# Patient Record
Sex: FEMALE | Race: WHITE | HISPANIC OR LATINO | URBAN - METROPOLITAN AREA
[De-identification: names, ages, dates, MRNs, and addresses within clinical notes are randomized per-mention and may not be internally consistent; named-entity substitution may affect disease eponyms.]

---

## 2017-11-04 ENCOUNTER — EMERGENCY (EMERGENCY)
Facility: HOSPITAL | Age: 32
LOS: 1 days | Discharge: HOME | End: 2017-11-04
Attending: EMERGENCY MEDICINE | Admitting: EMERGENCY MEDICINE
Payer: COMMERCIAL

## 2017-11-04 VITALS
RESPIRATION RATE: 18 BRPM | OXYGEN SATURATION: 98 % | SYSTOLIC BLOOD PRESSURE: 117 MMHG | TEMPERATURE: 98 F | DIASTOLIC BLOOD PRESSURE: 57 MMHG | HEART RATE: 98 BPM

## 2017-11-04 LAB
ALBUMIN SERPL ELPH-MCNC: 4 G/DL — SIGNIFICANT CHANGE UP (ref 3.3–5)
ALP SERPL-CCNC: 83 U/L — SIGNIFICANT CHANGE UP (ref 40–120)
ALT FLD-CCNC: 7 U/L — SIGNIFICANT CHANGE UP (ref 4–33)
APPEARANCE UR: SIGNIFICANT CHANGE UP
AST SERPL-CCNC: 12 U/L — SIGNIFICANT CHANGE UP (ref 4–32)
BASOPHILS # BLD AUTO: 0.03 K/UL — SIGNIFICANT CHANGE UP (ref 0–0.2)
BASOPHILS NFR BLD AUTO: 0.3 % — SIGNIFICANT CHANGE UP (ref 0–2)
BILIRUB SERPL-MCNC: 0.2 MG/DL — SIGNIFICANT CHANGE UP (ref 0.2–1.2)
BILIRUB UR-MCNC: NEGATIVE — SIGNIFICANT CHANGE UP
BLOOD UR QL VISUAL: HIGH
BUN SERPL-MCNC: 12 MG/DL — SIGNIFICANT CHANGE UP (ref 7–23)
CALCIUM SERPL-MCNC: 9 MG/DL — SIGNIFICANT CHANGE UP (ref 8.4–10.5)
CHLORIDE SERPL-SCNC: 102 MMOL/L — SIGNIFICANT CHANGE UP (ref 98–107)
CK MB BLD-MCNC: 1.79 NG/ML — SIGNIFICANT CHANGE UP (ref 1–4.7)
CK MB BLD-MCNC: SIGNIFICANT CHANGE UP (ref 0–2.5)
CK SERPL-CCNC: 147 U/L — SIGNIFICANT CHANGE UP (ref 25–170)
CO2 SERPL-SCNC: 25 MMOL/L — SIGNIFICANT CHANGE UP (ref 22–31)
COLOR SPEC: HIGH
CREAT SERPL-MCNC: 0.5 MG/DL — SIGNIFICANT CHANGE UP (ref 0.5–1.3)
D DIMER BLD IA.RAPID-MCNC: 163 NG/ML — SIGNIFICANT CHANGE UP
EOSINOPHIL # BLD AUTO: 0.04 K/UL — SIGNIFICANT CHANGE UP (ref 0–0.5)
EOSINOPHIL NFR BLD AUTO: 0.4 % — SIGNIFICANT CHANGE UP (ref 0–6)
GLUCOSE SERPL-MCNC: 94 MG/DL — SIGNIFICANT CHANGE UP (ref 70–99)
GLUCOSE UR-MCNC: NEGATIVE — SIGNIFICANT CHANGE UP
HBA1C BLD-MCNC: 5.8 % — HIGH (ref 4–5.6)
HCT VFR BLD CALC: 40.6 % — SIGNIFICANT CHANGE UP (ref 34.5–45)
HGB BLD-MCNC: 12.8 G/DL — SIGNIFICANT CHANGE UP (ref 11.5–15.5)
IMM GRANULOCYTES # BLD AUTO: 0.03 # — SIGNIFICANT CHANGE UP
IMM GRANULOCYTES NFR BLD AUTO: 0.3 % — SIGNIFICANT CHANGE UP (ref 0–1.5)
KETONES UR-MCNC: NEGATIVE — SIGNIFICANT CHANGE UP
LEUKOCYTE ESTERASE UR-ACNC: SIGNIFICANT CHANGE UP
LYMPHOCYTES # BLD AUTO: 2.01 K/UL — SIGNIFICANT CHANGE UP (ref 1–3.3)
LYMPHOCYTES # BLD AUTO: 20.6 % — SIGNIFICANT CHANGE UP (ref 13–44)
MCHC RBC-ENTMCNC: 30 PG — SIGNIFICANT CHANGE UP (ref 27–34)
MCHC RBC-ENTMCNC: 31.5 % — LOW (ref 32–36)
MCV RBC AUTO: 95.3 FL — SIGNIFICANT CHANGE UP (ref 80–100)
MONOCYTES # BLD AUTO: 0.68 K/UL — SIGNIFICANT CHANGE UP (ref 0–0.9)
MONOCYTES NFR BLD AUTO: 7 % — SIGNIFICANT CHANGE UP (ref 2–14)
MUCOUS THREADS # UR AUTO: SIGNIFICANT CHANGE UP
NEUTROPHILS # BLD AUTO: 6.99 K/UL — SIGNIFICANT CHANGE UP (ref 1.8–7.4)
NEUTROPHILS NFR BLD AUTO: 71.4 % — SIGNIFICANT CHANGE UP (ref 43–77)
NITRITE UR-MCNC: NEGATIVE — SIGNIFICANT CHANGE UP
NRBC # FLD: 0 — SIGNIFICANT CHANGE UP
PH UR: 6.5 — SIGNIFICANT CHANGE UP (ref 4.6–8)
PLATELET # BLD AUTO: 335 K/UL — SIGNIFICANT CHANGE UP (ref 150–400)
PMV BLD: 9.7 FL — SIGNIFICANT CHANGE UP (ref 7–13)
POTASSIUM SERPL-MCNC: 3.6 MMOL/L — SIGNIFICANT CHANGE UP (ref 3.5–5.3)
POTASSIUM SERPL-SCNC: 3.6 MMOL/L — SIGNIFICANT CHANGE UP (ref 3.5–5.3)
PROT SERPL-MCNC: 7.6 G/DL — SIGNIFICANT CHANGE UP (ref 6–8.3)
PROT UR-MCNC: 30 — HIGH
RBC # BLD: 4.26 M/UL — SIGNIFICANT CHANGE UP (ref 3.8–5.2)
RBC # FLD: 13.7 % — SIGNIFICANT CHANGE UP (ref 10.3–14.5)
RBC CASTS # UR COMP ASSIST: SIGNIFICANT CHANGE UP (ref 0–?)
SODIUM SERPL-SCNC: 140 MMOL/L — SIGNIFICANT CHANGE UP (ref 135–145)
SP GR SPEC: 1.02 — SIGNIFICANT CHANGE UP (ref 1–1.03)
SQUAMOUS # UR AUTO: SIGNIFICANT CHANGE UP
TROPONIN T SERPL-MCNC: < 0.06 NG/ML — SIGNIFICANT CHANGE UP (ref 0–0.06)
UROBILINOGEN FLD QL: NORMAL E.U. — SIGNIFICANT CHANGE UP (ref 0.1–0.2)
WBC # BLD: 9.78 K/UL — SIGNIFICANT CHANGE UP (ref 3.8–10.5)
WBC # FLD AUTO: 9.78 K/UL — SIGNIFICANT CHANGE UP (ref 3.8–10.5)
WBC UR QL: SIGNIFICANT CHANGE UP (ref 0–?)

## 2017-11-04 PROCEDURE — 99220: CPT | Mod: 25

## 2017-11-04 PROCEDURE — 93010 ELECTROCARDIOGRAM REPORT: CPT | Mod: 59

## 2017-11-04 PROCEDURE — 71020: CPT | Mod: 26

## 2017-11-04 RX ORDER — ASPIRIN/CALCIUM CARB/MAGNESIUM 324 MG
325 TABLET ORAL ONCE
Qty: 0 | Refills: 0 | Status: COMPLETED | OUTPATIENT
Start: 2017-11-04 | End: 2017-11-04

## 2017-11-04 RX ADMIN — Medication 325 MILLIGRAM(S): at 16:43

## 2017-11-04 NOTE — ED PROVIDER NOTE - PROGRESS NOTE DETAILS
Natividad RODRIGUES- pt agreed to stay in cdu for 2 sets vs stress test, plan to r/o acs, likely anxiety attack

## 2017-11-04 NOTE — ED CDU PROVIDER INITIAL DAY NOTE - MEDICAL DECISION MAKING DETAILS
HEART score 1, will get 2 sets of David, and pt will f/u with PMD within the next 3 days. Pt agreeable with plan.

## 2017-11-04 NOTE — ED CDU PROVIDER INITIAL DAY NOTE - ATTENDING CONTRIBUTION TO CARE
Dr. Silva: I performed a face to face bedside interview with patient regarding history of present illness, review of symptoms and past medical history. I completed an independent physical exam.  I have discussed patient's plan of care with PA.   I agree with note as stated above, having amended the EMR as needed to reflect my findings.   This includes HISTORY OF PRESENT ILLNESS, HIV, PAST MEDICAL/SURGICAL/FAMILY/SOCIAL HISTORY, ALLERGIES AND HOME MEDICATIONS, REVIEW OF SYSTEMS, PHYSICAL EXAM, and any PROGRESS NOTES during the time I functioned as the attending physician for this patient.  Dr. Silva: This H&P has been written by myself in its entirety

## 2017-11-04 NOTE — ED PROVIDER NOTE - MEDICAL DECISION MAKING DETAILS
will check labs including ck, trop, cxr ddx includes acs vs panic attack, acs unlikely, pt has no fhx , pt is young, non smoker btu has 1 risk factor of htn and also has hgih BMI

## 2017-11-04 NOTE — ED CDU PROVIDER INITIAL DAY NOTE - PROGRESS NOTE DETAILS
Received sign out from SUKHDEV Vanegas and Dr. Silva. pt states cp resolved, resting comfortably. exam: obese, lungs clear b/l, heart RRR. no calf tenderness b/l. no LE edema. states traveled on bus for 8 hours, had IUD in place 3 months ago that was removed. will dimer to r/o pe. will continue to monitor on tele and CE x 2.

## 2017-11-04 NOTE — ED PROVIDER NOTE - OBJECTIVE STATEMENT
32 y/o F with h/o htn, obesity , complex migraine p/w chest pain sudden onset while driving followed by chest tightness, sob and had numbness and coldness and then chest pain got worse and pt felt dizzy, this happened while patient was driving 1 hr ago and she was on phone with her dad, then she pulled over, called EMS and brought to the ED. No chest pain or sob now, feels better, never had symptoms like this before

## 2017-11-04 NOTE — ED CDU PROVIDER INITIAL DAY NOTE - OBJECTIVE STATEMENT
Dr. Silva: 31F h/o HTN, PCOS on metformin, obese p/w several days of intermittent left sided chest tightness radiating to the LUE, associated with sob, nausea and dizziness. Never had a cardiac work up. Non exertional. Non pleuritic. Pt is visiting from [place], arrived by bus. No calf pain, no calf swelling, no OCPs. No personal or fam h/o DVT or PE. Pt states she feels very anxious and stressed, and that brings on her sx. No fam h/o early CAD.  [facility]. Silva: 31F h/o HTN, PCOS on metformin, obese p/w several days of intermittent left sided chest tightness radiating to the LUE, associated with sob, nausea and dizziness. Never had a cardiac work up. Non exertional. Non pleuritic. Pt is visiting from [place], arrived by bus. No calf pain, no calf swelling, no OCPs. No personal or fam h/o DVT or PE. Pt states she feels very anxious and stressed, and that brings on her sx. No fam h/o early CAD.  Pt has a PMD in [facility] to f/u with.    SUKHDEV Arredondo: Agree with above. 30y/o female hx htn pcos c/o chest pain and lue pain x 1 day with asscociated dizziness/lightheaded. In ED had negative ce x 1 - sent to CDU for tele, ce x 2, observation.

## 2017-11-04 NOTE — ED ADULT TRIAGE NOTE - CHIEF COMPLAINT QUOTE
Arrives via EMS.  As per pt "I was driving and I felt a burning sensation in my stomach, and I felt nauseous"  Pt endorses left upper chest pain, by shoulder, that is able to be reproduced, and dizziness.  LMP currently

## 2017-11-05 VITALS
DIASTOLIC BLOOD PRESSURE: 66 MMHG | TEMPERATURE: 98 F | OXYGEN SATURATION: 98 % | SYSTOLIC BLOOD PRESSURE: 122 MMHG | RESPIRATION RATE: 18 BRPM | HEART RATE: 75 BPM

## 2017-11-05 LAB
CK MB BLD-MCNC: 1.52 NG/ML — SIGNIFICANT CHANGE UP (ref 1–4.7)
CK MB BLD-MCNC: SIGNIFICANT CHANGE UP (ref 0–2.5)
CK SERPL-CCNC: 120 U/L — SIGNIFICANT CHANGE UP (ref 25–170)
TROPONIN T SERPL-MCNC: < 0.06 NG/ML — SIGNIFICANT CHANGE UP (ref 0–0.06)

## 2017-11-05 PROCEDURE — 99217: CPT

## 2017-11-05 NOTE — ED CDU PROVIDER SUBSEQUENT DAY NOTE - PMH
Essential hypertension    Migraine    Polycystic disease, ovaries
Essential hypertension    Migraine    Polycystic disease, ovaries

## 2017-11-05 NOTE — ED CDU PROVIDER SUBSEQUENT DAY NOTE - CARDIAC, MLM
Normal rate, regular rhythm.  Heart sounds S1, S2.  No murmurs, rubs or gallops. No calf tenderness b/l. No LE edema.

## 2017-11-05 NOTE — ED CDU PROVIDER SUBSEQUENT DAY NOTE - HISTORY
30 y/o female with pmhx of HTN, PCOS, obesity, migraine presented to ED for acute onset of cp and sob, generalized numbness while driving to visit her father. Pt states cp resolved, intermittent sob, no sob right now. +travel from [place] on bus ride 8 hours. was on IUD gale was taken out 3 months ago. no fever, chills, cough, edema, calf pain. sent to cdu for tele and ce x2.

## 2017-11-05 NOTE — ED CDU PROVIDER SUBSEQUENT DAY NOTE - ATTENDING CONTRIBUTION TO CARE
I performed a face to face bedside interview with patient regarding history of present illness, review of symptoms and past medical history. I completed an independent physical exam.  I have discussed patient's plan of care.   I agree with note as stated above, having amended the EMR as needed to reflect my findings. I have discussed the assessment and plan of care.  This includes during the time I functioned as the attending physician for this patient.  Attending Contribution to Care:agree with plan of PA. pt p/w typical cp with radiation. stress test neg, echo neg. stable for d/c, f/u with pmd.

## 2017-11-05 NOTE — ED CDU PROVIDER SUBSEQUENT DAY NOTE - CONSTITUTIONAL, MLM
normal... Well appearing, well nourished, awake, alert, oriented to person, place, time/situation and in no apparent distress. Obese. Resting comfortably in bed.

## 2017-11-05 NOTE — ED CDU PROVIDER DISPOSITION NOTE - CLINICAL COURSE
siddhartha: pt stable, complete symptomatic resolution. p/w cp. stress and echo neg. d dimer neg for low risk cp to r/o PE. understands instructions. will f/u with pmd

## 2017-11-05 NOTE — ED CDU PROVIDER SUBSEQUENT DAY NOTE - PROGRESS NOTE DETAILS
Pt reassessed, has not had chest pain or sob since coming to the CDU. D-dimer and CEx2 negative. No events on tele. Discussed cardiology follow up for further evaluation, pt ok with plan, has a doctor to f/u w/ in [place]. Aware of A1C results, states she has been pre-diabetic, has been doing diet modifications.

## 2017-11-05 NOTE — ED CDU PROVIDER DISPOSITION NOTE - PLAN OF CARE
Follow up with your primary care doctor and cardiologist within 48-72 hours. Show copies of your reports given to you. Take all of your other medications as previously prescribed. Make sure to keep a diet low in carbohydrates and sugars, high in protein and fiber, because you have been found to be pre-diabetic. Try to exercise for 30 minutes at least 3x/week. Worsening or continued chest pain, shortness of breath, weakness,  or any other concerns return to ED.

## 2017-11-05 NOTE — ED CDU PROVIDER SUBSEQUENT DAY NOTE - FAMILY HISTORY
Mother  Still living? Unknown  Family history of cardiac disorder, Age at diagnosis: Age Unknown
Mother  Still living? Unknown  Family history of cardiac disorder, Age at diagnosis: Age Unknown

## 2024-05-14 NOTE — ED PROVIDER NOTE - DIAGNOSIS COUNSELING, MDM
conducted a detailed discussion... What Type Of Note Output Would You Prefer (Optional)?: Bullet Format How Severe Is Your Skin Lesion?: mild Has Your Skin Lesion Been Treated?: not been treated Is This A New Presentation, Or A Follow-Up?: Skin Lesion